# Patient Record
Sex: MALE | Race: WHITE | NOT HISPANIC OR LATINO | URBAN - METROPOLITAN AREA
[De-identification: names, ages, dates, MRNs, and addresses within clinical notes are randomized per-mention and may not be internally consistent; named-entity substitution may affect disease eponyms.]

---

## 2018-01-30 ENCOUNTER — EMERGENCY (EMERGENCY)
Facility: HOSPITAL | Age: 23
LOS: 1 days | Discharge: HOME | End: 2018-01-30
Attending: EMERGENCY MEDICINE | Admitting: EMERGENCY MEDICINE
Payer: COMMERCIAL

## 2018-01-30 VITALS
HEART RATE: 143 BPM | HEIGHT: 65 IN | RESPIRATION RATE: 34 BRPM | WEIGHT: 160.06 LBS | OXYGEN SATURATION: 90 % | SYSTOLIC BLOOD PRESSURE: 145 MMHG | DIASTOLIC BLOOD PRESSURE: 84 MMHG

## 2018-01-30 VITALS
OXYGEN SATURATION: 98 % | DIASTOLIC BLOOD PRESSURE: 63 MMHG | TEMPERATURE: 98 F | SYSTOLIC BLOOD PRESSURE: 118 MMHG | HEART RATE: 87 BPM | RESPIRATION RATE: 22 BRPM

## 2018-01-30 DIAGNOSIS — Z98.2 PRESENCE OF CEREBROSPINAL FLUID DRAINAGE DEVICE: Chronic | ICD-10-CM

## 2018-01-30 LAB
ALBUMIN SERPL ELPH-MCNC: 5.3 G/DL — HIGH (ref 3.3–5.2)
ALP SERPL-CCNC: 108 U/L — SIGNIFICANT CHANGE UP (ref 40–120)
ALT FLD-CCNC: 25 U/L — SIGNIFICANT CHANGE UP
ANION GAP SERPL CALC-SCNC: 13 MMOL/L — SIGNIFICANT CHANGE UP (ref 5–17)
ANION GAP SERPL CALC-SCNC: >43 MMOL/L — HIGH (ref 5–17)
AST SERPL-CCNC: 32 U/L — SIGNIFICANT CHANGE UP
BILIRUB SERPL-MCNC: 0.3 MG/DL — LOW (ref 0.4–2)
BUN SERPL-MCNC: 10 MG/DL — SIGNIFICANT CHANGE UP (ref 8–20)
BUN SERPL-MCNC: 8 MG/DL — SIGNIFICANT CHANGE UP (ref 8–20)
CALCIUM SERPL-MCNC: 10 MG/DL — SIGNIFICANT CHANGE UP (ref 8.6–10.2)
CALCIUM SERPL-MCNC: 8.2 MG/DL — LOW (ref 8.6–10.2)
CARBAMAZEPINE SERPL-MCNC: 9.4 UG/ML — SIGNIFICANT CHANGE UP (ref 4–10)
CHLORIDE SERPL-SCNC: 81 MMOL/L — LOW (ref 98–107)
CHLORIDE SERPL-SCNC: 92 MMOL/L — LOW (ref 98–107)
CO2 SERPL-SCNC: 22 MMOL/L — SIGNIFICANT CHANGE UP (ref 22–29)
CO2 SERPL-SCNC: <6 MMOL/L — CRITICAL LOW (ref 22–29)
CREAT SERPL-MCNC: 0.94 MG/DL — SIGNIFICANT CHANGE UP (ref 0.5–1.3)
CREAT SERPL-MCNC: 1.05 MG/DL — SIGNIFICANT CHANGE UP (ref 0.5–1.3)
EOSINOPHIL NFR BLD AUTO: 2 % — SIGNIFICANT CHANGE UP (ref 0–6)
GLUCOSE SERPL-MCNC: 110 MG/DL — SIGNIFICANT CHANGE UP (ref 70–115)
GLUCOSE SERPL-MCNC: 166 MG/DL — HIGH (ref 70–115)
HCT VFR BLD CALC: 49.6 % — SIGNIFICANT CHANGE UP (ref 42–52)
HGB BLD-MCNC: 16.9 G/DL — SIGNIFICANT CHANGE UP (ref 14–18)
LYMPHOCYTES # BLD AUTO: 47 % — SIGNIFICANT CHANGE UP (ref 20–55)
MCHC RBC-ENTMCNC: 30.3 PG — SIGNIFICANT CHANGE UP (ref 27–31)
MCHC RBC-ENTMCNC: 34.1 G/DL — SIGNIFICANT CHANGE UP (ref 32–36)
MCV RBC AUTO: 88.9 FL — SIGNIFICANT CHANGE UP (ref 80–94)
MONOCYTES NFR BLD AUTO: 14 % — HIGH (ref 3–10)
NEUTROPHILS NFR BLD AUTO: 37 % — SIGNIFICANT CHANGE UP (ref 37–73)
NRBC # BLD: 1 /100 — HIGH (ref 0–0)
PLAT MORPH BLD: NORMAL — SIGNIFICANT CHANGE UP
PLATELET # BLD AUTO: 316 K/UL — SIGNIFICANT CHANGE UP (ref 150–400)
POTASSIUM SERPL-MCNC: 3.4 MMOL/L — LOW (ref 3.5–5.3)
POTASSIUM SERPL-MCNC: 4 MMOL/L — SIGNIFICANT CHANGE UP (ref 3.5–5.3)
POTASSIUM SERPL-SCNC: 3.4 MMOL/L — LOW (ref 3.5–5.3)
POTASSIUM SERPL-SCNC: 4 MMOL/L — SIGNIFICANT CHANGE UP (ref 3.5–5.3)
PROT SERPL-MCNC: 8.6 G/DL — SIGNIFICANT CHANGE UP (ref 6.6–8.7)
RBC # BLD: 5.58 M/UL — SIGNIFICANT CHANGE UP (ref 4.6–6.2)
RBC # FLD: 12.5 % — SIGNIFICANT CHANGE UP (ref 11–15.6)
RBC BLD AUTO: NORMAL — SIGNIFICANT CHANGE UP
SODIUM SERPL-SCNC: 127 MMOL/L — LOW (ref 135–145)
SODIUM SERPL-SCNC: 130 MMOL/L — LOW (ref 135–145)
WBC # BLD: 19.5 K/UL — HIGH (ref 4.8–10.8)
WBC # FLD AUTO: 19.5 K/UL — HIGH (ref 4.8–10.8)

## 2018-01-30 PROCEDURE — 99283 EMERGENCY DEPT VISIT LOW MDM: CPT | Mod: 25

## 2018-01-30 PROCEDURE — 99284 EMERGENCY DEPT VISIT MOD MDM: CPT

## 2018-01-30 PROCEDURE — 82553 CREATINE MB FRACTION: CPT

## 2018-01-30 PROCEDURE — 85027 COMPLETE CBC AUTOMATED: CPT

## 2018-01-30 PROCEDURE — 80053 COMPREHEN METABOLIC PANEL: CPT

## 2018-01-30 PROCEDURE — 80048 BASIC METABOLIC PNL TOTAL CA: CPT

## 2018-01-30 PROCEDURE — 80156 ASSAY CARBAMAZEPINE TOTAL: CPT

## 2018-01-30 PROCEDURE — 93010 ELECTROCARDIOGRAM REPORT: CPT

## 2018-01-30 PROCEDURE — 82550 ASSAY OF CK (CPK): CPT

## 2018-01-30 PROCEDURE — 93005 ELECTROCARDIOGRAM TRACING: CPT

## 2018-01-30 PROCEDURE — 36415 COLL VENOUS BLD VENIPUNCTURE: CPT

## 2018-01-30 RX ORDER — SODIUM CHLORIDE 9 MG/ML
1000 INJECTION INTRAMUSCULAR; INTRAVENOUS; SUBCUTANEOUS ONCE
Refills: 0 | Status: COMPLETED | OUTPATIENT
Start: 2018-01-30 | End: 2018-01-30

## 2018-01-30 RX ORDER — CARBAMAZEPINE 200 MG
700 TABLET ORAL ONCE
Refills: 0 | Status: COMPLETED | OUTPATIENT
Start: 2018-01-30 | End: 2018-01-30

## 2018-01-30 RX ORDER — OLANZAPINE 15 MG/1
5 TABLET, FILM COATED ORAL ONCE
Refills: 0 | Status: COMPLETED | OUTPATIENT
Start: 2018-01-30 | End: 2018-01-30

## 2018-01-30 RX ADMIN — Medication 700 MILLIGRAM(S): at 23:25

## 2018-01-30 RX ADMIN — OLANZAPINE 5 MILLIGRAM(S): 15 TABLET, FILM COATED ORAL at 23:25

## 2018-01-30 RX ADMIN — SODIUM CHLORIDE 2000 MILLILITER(S): 9 INJECTION INTRAMUSCULAR; INTRAVENOUS; SUBCUTANEOUS at 22:10

## 2018-01-30 NOTE — ED ADULT NURSE NOTE - OBJECTIVE STATEMENT
special needs individual c/o seizure x 2 today. patient is non verbal, autistics. parents at the bedside.

## 2018-01-30 NOTE — ED PROVIDER NOTE - OBJECTIVE STATEMENT
23 y/o M pt with hx of epilepsy presents to ED with group home staff c/o seizures. Patient's group home brought him to [facility] today where he had 2 seizures, one lasted 45 seconds and one lasted 60 seconds. Pt is nonverbal at baseline per patient's father. Group home staff notes patient's Tegretol was increased 3 weeks ago.   Neuro: Luna 23 y/o M pt with hx of epilepsy and austism presents to ED with group home staff c/o seizures. Patient's group home brought him to [facility] today where he had 2 seizures, one lasted 45 seconds and one lasted 60 seconds. Pt is nonverbal at baseline per patient's father. Group home staff notes patient's Tegretol was increased 3 weeks ago.   Neuro: Luna

## 2018-01-30 NOTE — ED PROVIDER NOTE - MEDICAL DECISION MAKING DETAILS
back at [facility] per family no fevers will f.u with there established neurologist return to ed for intractable HA, persistent vomiting, or new onset motor/sensory deficits

## 2018-01-30 NOTE — ED ADULT TRIAGE NOTE - CHIEF COMPLAINT QUOTE
Patient brought in by ambulance, was Ilya Zone-s/p seizure-45seconds, patient recd 5mg IM Versed.  Patient belongs to [facility] group [facility].

## 2018-02-09 ENCOUNTER — INPATIENT (INPATIENT)
Facility: HOSPITAL | Age: 23
LOS: 1 days | Discharge: HOME | DRG: 101 | End: 2018-02-11
Attending: HOSPITALIST | Admitting: HOSPITALIST
Payer: COMMERCIAL

## 2018-02-09 VITALS
WEIGHT: 167.99 LBS | SYSTOLIC BLOOD PRESSURE: 132 MMHG | RESPIRATION RATE: 16 BRPM | HEIGHT: 62 IN | OXYGEN SATURATION: 97 % | DIASTOLIC BLOOD PRESSURE: 60 MMHG | HEART RATE: 140 BPM

## 2018-02-09 DIAGNOSIS — N17.9 ACUTE KIDNEY FAILURE, UNSPECIFIED: ICD-10-CM

## 2018-02-09 DIAGNOSIS — M62.82 RHABDOMYOLYSIS: ICD-10-CM

## 2018-02-09 DIAGNOSIS — Z98.2 PRESENCE OF CEREBROSPINAL FLUID DRAINAGE DEVICE: Chronic | ICD-10-CM

## 2018-02-09 DIAGNOSIS — I10 ESSENTIAL (PRIMARY) HYPERTENSION: ICD-10-CM

## 2018-02-09 DIAGNOSIS — R56.9 UNSPECIFIED CONVULSIONS: ICD-10-CM

## 2018-02-09 DIAGNOSIS — E72.20 DISORDER OF UREA CYCLE METABOLISM, UNSPECIFIED: ICD-10-CM

## 2018-02-09 DIAGNOSIS — E87.1 HYPO-OSMOLALITY AND HYPONATREMIA: ICD-10-CM

## 2018-02-09 DIAGNOSIS — Z96.89 PRESENCE OF OTHER SPECIFIED FUNCTIONAL IMPLANTS: Chronic | ICD-10-CM

## 2018-02-09 DIAGNOSIS — Z29.9 ENCOUNTER FOR PROPHYLACTIC MEASURES, UNSPECIFIED: ICD-10-CM

## 2018-02-09 LAB
ALBUMIN SERPL ELPH-MCNC: 4.6 G/DL — SIGNIFICANT CHANGE UP (ref 3.3–5)
ALP SERPL-CCNC: 95 U/L — SIGNIFICANT CHANGE UP (ref 40–120)
ALT FLD-CCNC: 24 U/L — SIGNIFICANT CHANGE UP (ref 12–78)
AMMONIA BLD-MCNC: 143 UMOL/L — HIGH (ref 11–32)
ANION GAP SERPL CALC-SCNC: 27 MMOL/L — HIGH (ref 5–17)
APPEARANCE UR: CLEAR — SIGNIFICANT CHANGE UP
AST SERPL-CCNC: 21 U/L — SIGNIFICANT CHANGE UP (ref 15–37)
BACTERIA # UR AUTO: ABNORMAL
BASOPHILS # BLD AUTO: 0.1 K/UL — SIGNIFICANT CHANGE UP (ref 0–0.2)
BASOPHILS NFR BLD AUTO: 0.6 % — SIGNIFICANT CHANGE UP (ref 0–2)
BILIRUB SERPL-MCNC: 0.2 MG/DL — SIGNIFICANT CHANGE UP (ref 0.2–1.2)
BILIRUB UR-MCNC: NEGATIVE — SIGNIFICANT CHANGE UP
BUN SERPL-MCNC: 12 MG/DL — SIGNIFICANT CHANGE UP (ref 7–23)
CALCIUM SERPL-MCNC: 8.7 MG/DL — SIGNIFICANT CHANGE UP (ref 8.5–10.1)
CARBAMAZEPINE SERPL-MCNC: 10.4 UG/ML — SIGNIFICANT CHANGE UP (ref 4–12)
CHLORIDE SERPL-SCNC: 95 MMOL/L — LOW (ref 96–108)
CK MB BLD-MCNC: 1.2 % — SIGNIFICANT CHANGE UP (ref 0–3.5)
CK MB CFR SERPL CALC: 3.8 NG/ML — HIGH (ref 0–3.6)
CK SERPL-CCNC: 328 U/L — HIGH (ref 26–308)
CO2 SERPL-SCNC: 9 MMOL/L — CRITICAL LOW (ref 22–31)
COLOR SPEC: YELLOW — SIGNIFICANT CHANGE UP
CREAT SERPL-MCNC: 1.5 MG/DL — HIGH (ref 0.5–1.3)
DIFF PNL FLD: ABNORMAL
EOSINOPHIL # BLD AUTO: 0 K/UL — SIGNIFICANT CHANGE UP (ref 0–0.5)
EOSINOPHIL NFR BLD AUTO: 0.3 % — SIGNIFICANT CHANGE UP (ref 0–6)
EPI CELLS # UR: SIGNIFICANT CHANGE UP
GLUCOSE SERPL-MCNC: 124 MG/DL — HIGH (ref 70–99)
GLUCOSE UR QL: NEGATIVE — SIGNIFICANT CHANGE UP
HCT VFR BLD CALC: 45.4 % — SIGNIFICANT CHANGE UP (ref 39–50)
HGB BLD-MCNC: 15.4 G/DL — SIGNIFICANT CHANGE UP (ref 13–17)
KETONES UR-MCNC: ABNORMAL
LEUKOCYTE ESTERASE UR-ACNC: NEGATIVE — SIGNIFICANT CHANGE UP
LYMPHOCYTES # BLD AUTO: 25.5 % — SIGNIFICANT CHANGE UP (ref 13–44)
LYMPHOCYTES # BLD AUTO: 3.8 K/UL — HIGH (ref 1–3.3)
MCHC RBC-ENTMCNC: 29.4 PG — SIGNIFICANT CHANGE UP (ref 27–34)
MCHC RBC-ENTMCNC: 34 GM/DL — SIGNIFICANT CHANGE UP (ref 32–36)
MCV RBC AUTO: 86.6 FL — SIGNIFICANT CHANGE UP (ref 80–100)
MONOCYTES # BLD AUTO: 0.9 K/UL — SIGNIFICANT CHANGE UP (ref 0–0.9)
MONOCYTES NFR BLD AUTO: 6.2 % — SIGNIFICANT CHANGE UP (ref 1–9)
NEUTROPHILS # BLD AUTO: 10.1 K/UL — HIGH (ref 1.8–7.4)
NEUTROPHILS NFR BLD AUTO: 67.5 % — SIGNIFICANT CHANGE UP (ref 43–77)
NITRITE UR-MCNC: NEGATIVE — SIGNIFICANT CHANGE UP
PH UR: 5 — SIGNIFICANT CHANGE UP (ref 5–8)
PLATELET # BLD AUTO: 305 K/UL — SIGNIFICANT CHANGE UP (ref 150–400)
POTASSIUM SERPL-MCNC: 3.9 MMOL/L — SIGNIFICANT CHANGE UP (ref 3.5–5.3)
POTASSIUM SERPL-SCNC: 3.9 MMOL/L — SIGNIFICANT CHANGE UP (ref 3.5–5.3)
PROT SERPL-MCNC: 8.3 G/DL — SIGNIFICANT CHANGE UP (ref 6–8.3)
PROT UR-MCNC: 25 MG/DL
RBC # BLD: 5.24 M/UL — SIGNIFICANT CHANGE UP (ref 4.2–5.8)
RBC # FLD: 11.1 % — SIGNIFICANT CHANGE UP (ref 10.3–14.5)
RBC CASTS # UR COMP ASSIST: SIGNIFICANT CHANGE UP /HPF (ref 0–4)
SODIUM SERPL-SCNC: 131 MMOL/L — LOW (ref 135–145)
SP GR SPEC: 1.01 — SIGNIFICANT CHANGE UP (ref 1.01–1.02)
UROBILINOGEN FLD QL: NEGATIVE — SIGNIFICANT CHANGE UP
WBC # BLD: 14.9 K/UL — HIGH (ref 3.8–10.5)
WBC # FLD AUTO: 14.9 K/UL — HIGH (ref 3.8–10.5)
WBC UR QL: SIGNIFICANT CHANGE UP

## 2018-02-09 PROCEDURE — 71045 X-RAY EXAM CHEST 1 VIEW: CPT | Mod: 26

## 2018-02-09 PROCEDURE — 99285 EMERGENCY DEPT VISIT HI MDM: CPT

## 2018-02-09 PROCEDURE — 99223 1ST HOSP IP/OBS HIGH 75: CPT | Mod: AI,GC

## 2018-02-09 RX ORDER — CARBAMAZEPINE 200 MG
700 TABLET ORAL
Qty: 0 | Refills: 0 | Status: DISCONTINUED | OUTPATIENT
Start: 2018-02-09 | End: 2018-02-10

## 2018-02-09 RX ORDER — OLANZAPINE 15 MG/1
2.5 TABLET, FILM COATED ORAL DAILY
Qty: 0 | Refills: 0 | Status: DISCONTINUED | OUTPATIENT
Start: 2018-02-09 | End: 2018-02-09

## 2018-02-09 RX ORDER — RUFINAMIDE 40 MG/ML
1200 SUSPENSION ORAL
Qty: 0 | Refills: 0 | Status: DISCONTINUED | OUTPATIENT
Start: 2018-02-09 | End: 2018-02-11

## 2018-02-09 RX ORDER — SODIUM CHLORIDE 9 MG/ML
1000 INJECTION INTRAMUSCULAR; INTRAVENOUS; SUBCUTANEOUS ONCE
Qty: 0 | Refills: 0 | Status: COMPLETED | OUTPATIENT
Start: 2018-02-09 | End: 2018-02-09

## 2018-02-09 RX ORDER — LISINOPRIL 2.5 MG/1
5 TABLET ORAL DAILY
Qty: 0 | Refills: 0 | Status: DISCONTINUED | OUTPATIENT
Start: 2018-02-09 | End: 2018-02-09

## 2018-02-09 RX ORDER — SODIUM CHLORIDE 9 MG/ML
1000 INJECTION INTRAMUSCULAR; INTRAVENOUS; SUBCUTANEOUS
Qty: 0 | Refills: 0 | Status: DISCONTINUED | OUTPATIENT
Start: 2018-02-09 | End: 2018-02-10

## 2018-02-09 RX ORDER — OLANZAPINE 15 MG/1
5 TABLET, FILM COATED ORAL DAILY
Qty: 0 | Refills: 0 | Status: DISCONTINUED | OUTPATIENT
Start: 2018-02-09 | End: 2018-02-11

## 2018-02-09 RX ORDER — OLANZAPINE 15 MG/1
2.5 TABLET, FILM COATED ORAL DAILY
Qty: 0 | Refills: 0 | Status: DISCONTINUED | OUTPATIENT
Start: 2018-02-09 | End: 2018-02-11

## 2018-02-09 RX ORDER — OLANZAPINE 15 MG/1
5 TABLET, FILM COATED ORAL DAILY
Qty: 0 | Refills: 0 | Status: DISCONTINUED | OUTPATIENT
Start: 2018-02-09 | End: 2018-02-09

## 2018-02-09 RX ADMIN — SODIUM CHLORIDE 100 MILLILITER(S): 9 INJECTION INTRAMUSCULAR; INTRAVENOUS; SUBCUTANEOUS at 23:14

## 2018-02-09 RX ADMIN — RUFINAMIDE 1200 MILLIGRAM(S): 40 SUSPENSION ORAL at 21:38

## 2018-02-09 RX ADMIN — SODIUM CHLORIDE 1000 MILLILITER(S): 9 INJECTION INTRAMUSCULAR; INTRAVENOUS; SUBCUTANEOUS at 19:30

## 2018-02-09 NOTE — H&P ADULT - PROBLEM SELECTOR PLAN 7
DVT PPX: SCDs  IMPROVE VTE Individual Risk Assessment          RISK                                                          Points  [  ] Previous VTE                                                3  [  ] Thrombophilia                                             2  [  ] Lower limb paralysis                                   2        (unable to hold up >15 seconds)    [  ] Current Cancer                                             2         (within 6 months)  [  ] Immobilization > 24 hrs                              1  [  ] ICU/CCU stay > 24 hours                             1  [  ] Age > 60                                                         1    IMPROVE VTE Score: 0

## 2018-02-09 NOTE — H&P ADULT - HISTORY OF PRESENT ILLNESS
22 year old male with PMH of Autism, nonverbal-MR, Epilepsy with hx of rhabdo and has left sided VNS, presented with seizures. Hx obtained from parents and brother at bedside. Patient is a resident of [facility], and comes home on the weekend. Patient's father had picked him up today and when he parked in the driveway patient began seizing. Described the seizure as similar to the grand mal tonic clonic seizures he has had in the past, he began to shake uncontrollably, eyes rolled back and he spit up some phlegm/clear fluid. As per father patient had his seatbelt still on, denied vomiting, loss of bowel/bladder control, or head trauma, episode lasted about a minute and a half. Patient had another seizure episode in the ambulance, this one was about a minute and terminated with administration of IV midazolam by EMS, no vomiting, loss of bowel/bladder control, or head trauma. Patient had a third seizure while in the ER, lasted 15 seconds, terminated when patient's father swiped magnet over VNS stimulater, again no vomiting, HT, loss of bowel/bladder.  Of note patient has had more frequent seizures since January (approx one every 2 weeks) when one of his seizure medications were adjusted (Tegretol). Also was started on Accupril about one week prior. As per parents patient did not have any recent illness, no fever, cough, urinary sx prior to these events, was in usual state of health.  Vital Signs in ED  T(F): 98 HR: 100 BP: 134/68 RR: 18 SpO2: 100%  Labs sig for WBC 14.9, Na 131, Cr 1.5, Ammonia 143, , CKMB 3.8   UA showed moderate blood, Protein 25  CXR: No evidence for pulmonary consolidation, pleural effusion or   pneumothorax.

## 2018-02-09 NOTE — H&P ADULT - PROBLEM SELECTOR PLAN 1
Admit to [facility]  Seizure precautions  Continue with home seizure medications, carbamazepine lvl wnl, hyponatremic, elevated CK and ammonia  Continue with NS @ 100cc hr  Neuro Dr Narvaez consulted, recs appreciated Admit to [facility]  Seizure precautions  Continue with home seizure medications  Continue with NS @ 100cc hr  Neuro Dr Narvaez consulted, recs appreciated

## 2018-02-09 NOTE — H&P ADULT - PROBLEM SELECTOR PLAN 4
Likely due to dehydration  C/w NS @ 100 cc/hr for now  Avoid nephrotoxic agents  Monitor renal indices Likely due to dehydration  C/w NS @ 100 cc/hr for now  Avoid nephrotoxic agents, hold ACE   Monitor renal indices

## 2018-02-09 NOTE — ED ADULT NURSE NOTE - PMH
Agitation  at times and angry  Autistic disorder    Constipation    GERD (gastroesophageal reflux disease)    Psychomotor epilepsy, intractable    Seizure disorder  grand mal, started age 11

## 2018-02-09 NOTE — H&P ADULT - NSHPPHYSICALEXAM_GEN_ALL_CORE
Physical Exam:  General: Somnolent  male  HEENT: Patient somnolent however clenching his eyes and mouth closed, NCAT, PERRLA  Neck: Supple, nontender, no mass  Neurology: A&Ox0, nonfocal, unable to assess CN, is responsive to tactile stimuli, appears agitated when attempted to wake him up, otherwise resting quietly, moving UE and head spontaneously, not opening eyes or mouth  Respiratory: CTA B/L, No W/R/R  CV: RRR, +S1/S2, no murmurs, rubs or gallops  Abdominal: Soft, NT, ND +BSx4  Extremities: No C/C/E, + peripheral pulses  MSK: no joint erythema or warmth, no joint swelling   Skin: warm, dry, normal color Physical Exam:  General: Somnolent  male  HEENT: Patient somnolent however clenching his eyes and mouth closed, NCAT, PERRLA  Neck: Supple, nontender, no mass  Neurology: A&Ox0, nonfocal, unable to assess CN, is responsive to tactile stimuli, appears agitated when attempted to wake him up, otherwise resting quietly, moving UE and head spontaneously. non verbal but cues parents to go to bathroom. Observed gait, WNL  Respiratory: CTA B/L, No W/R/R  CV: RRR, +S1/S2, no murmurs, rubs or gallops  Abdominal: Soft, NT, ND +BSx4  Extremities: No C/C/E, + peripheral pulses  MSK: no joint erythema or warmth, no joint swelling   Skin: warm, dry, normal color

## 2018-02-09 NOTE — ED PROVIDER NOTE - CARE PLAN
Principal Discharge DX:	Seizure Principal Discharge DX:	Seizure  Secondary Diagnosis:	Hyperammonemia

## 2018-02-09 NOTE — ED PROVIDER NOTE - OBJECTIVE STATEMENT
Pt is a 21 yo male who has hx of autism, not  verbal mr epilepsy with rhabdo an implanted vagal nerve stimulator whose pmd is dr sam huff in [facility]). during the week he is a resident of [facility] and on weekends he comes home with parents.  Dad was pulling into the drive and noticed pt was seizing.  lasted a minute. ems was called and when they arrived pt had a second seizure.  pt was given midazolam iv by ems and stopped seizing.  he is accompanied by his parents who provided data

## 2018-02-09 NOTE — H&P ADULT - NSHPSOCIALHISTORY_GEN_ALL_CORE
Resident of [facility] in [facility] M-F, weekends with parents  No hx of smoking, ETOH  Severe autism with hx of epilepsy  Able to walk independently- no cane/walker

## 2018-02-09 NOTE — ED ADULT NURSE REASSESSMENT NOTE - NS ED NURSE REASSESS COMMENT FT1
witnessed gran mal seizure.  ativan given via verbal order Dr molina.  Seizure precautions maintained

## 2018-02-09 NOTE — H&P ADULT - PROBLEM SELECTOR PLAN 3
Patient has hx of rhabdo w/ seizures in the past. CK elevated although doesn't meet cut off, moderate blood noted in urine. K+ wnl  Continue with NS at 100cc hr for now  monitor BMP, morning labs Patient has hx of rhabdo w/ seizures in the past. CK elevated although doesn't meet cut off, moderate blood noted in urine. K+ wnl. Per parents, CPK often over [id number] after 24 hours post seizure  Continue with NS at 100cc hr for now  monitor BMP, morning labs

## 2018-02-09 NOTE — H&P ADULT - PROBLEM SELECTOR PLAN 5
Mild hyponatremia, c/w NS 100cc/hr Mild hyponatremia, c/w NS 100cc/hr  attribute to meds [id number]) vs decreased PO intake  consider serum Osm workup if persists   Recheck level at 2AM, avoid rapid correction

## 2018-02-09 NOTE — ED ADULT TRIAGE NOTE - CHIEF COMPLAINT QUOTE
brought in by EMS for seizure. patient with history of seizure having 2 episodes tonight, one at home and one in the ambulance. EMS gave 1 x 5mg versed IV

## 2018-02-09 NOTE — H&P ADULT - ATTENDING COMMENTS
Seen and examined by attending MD, agree with above and edited to reflect my findings. Case discussed with parents who are at bedside, and voiced understanding and agreement to aforementioned plan.

## 2018-02-09 NOTE — H&P ADULT - PROBLEM SELECTOR PLAN 2
Continue with NS 100cc hr for now. Consider lactulose if f/u ammonia lvl elevated- if elevated consider lactulose Continue with NS 100cc hr for now  Receheck after IVF  hydration, if f/u ammonia lvl elevated- if elevated consider lactulose

## 2018-02-09 NOTE — ED PROVIDER NOTE - PROGRESS NOTE DETAILS
pt has another seizure, stopped with his vns magnet by dad  mom expressed concern that he needs admission for fear of rhabdo  neurology  paged dr wilson and hospitalis paged to admit edita wilson- increase benzyl to 1200 bid will see in am continue other meds as well. aware of current lab results

## 2018-02-09 NOTE — ED ADULT NURSE NOTE - OBJECTIVE STATEMENT
brought in by ems s/p seizure activity at home.  patient received versed in field.  patient is not arousable.  moves with tactile stimuli

## 2018-02-09 NOTE — H&P ADULT - ASSESSMENT
22 year old male with PMH of Autism, nonverbal-MR, Epilepsy with hx of rhabdo and has left sided VNS, admitted with seizures.

## 2018-02-09 NOTE — H&P ADULT - NSHPLABSRESULTS_GEN_ALL_CORE
15.4   14.9  )-----------( 305      ( 2018 19:51 )             45.4     2018 19:51    131    |  95     |  12     ----------------------------<  124    3.9     |  9      |  1.50     Ca    8.7        2018 19:51    TPro  8.3    /  Alb  4.6    /  TBili  0.2    /  DBili  x      /  AST  21     /  ALT  24     /  AlkPhos  95     2018 19:51    LIVER FUNCTIONS - ( 2018 19:51 )  Alb: 4.6 g/dL / Pro: 8.3 g/dL / ALK PHOS: 95 U/L / ALT: 24 U/L / AST: 21 U/L / GGT: x             CAPILLARY BLOOD GLUCOSE        CARDIAC MARKERS ( 2018 19:51 )  x     / x     / 328 U/L / x     / 3.8 ng/mL      Urinalysis Basic - ( 2018 19:51 )    Color: Yellow / Appearance: Clear / S.015 / pH: x  Gluc: x / Ketone: Small  / Bili: Negative / Urobili: Negative   Blood: x / Protein: 25 mg/dL / Nitrite: Negative   Leuk Esterase: Negative / RBC: 0-2 /HPF / WBC 0-2   Sq Epi: x / Non Sq Epi: Occasional / Bacteria: Occasional

## 2018-02-09 NOTE — ED PROVIDER NOTE - NEUROLOGICAL, MLM
asleep unable to awaken as he was medicated, no current seizure activity no incontinence or lacerations

## 2018-02-09 NOTE — H&P ADULT - NSHPOUTPATIENTPROVIDERS_GEN_ALL_CORE
PMD Dr. Sherman Pretty ([facility]) PMD Dr. Sherman Pretty ([facility] [place])

## 2018-02-10 LAB
AMMONIA BLD-MCNC: 34 UMOL/L — HIGH (ref 11–32)
ANION GAP SERPL CALC-SCNC: 11 MMOL/L — SIGNIFICANT CHANGE UP (ref 5–17)
ANION GAP SERPL CALC-SCNC: 8 MMOL/L — SIGNIFICANT CHANGE UP (ref 5–17)
BUN SERPL-MCNC: 7 MG/DL — SIGNIFICANT CHANGE UP (ref 7–23)
BUN SERPL-MCNC: 9 MG/DL — SIGNIFICANT CHANGE UP (ref 7–23)
CALCIUM SERPL-MCNC: 7.8 MG/DL — LOW (ref 8.5–10.1)
CALCIUM SERPL-MCNC: 8.3 MG/DL — LOW (ref 8.5–10.1)
CHLORIDE SERPL-SCNC: 110 MMOL/L — HIGH (ref 96–108)
CHLORIDE SERPL-SCNC: 110 MMOL/L — HIGH (ref 96–108)
CO2 SERPL-SCNC: 22 MMOL/L — SIGNIFICANT CHANGE UP (ref 22–31)
CO2 SERPL-SCNC: 24 MMOL/L — SIGNIFICANT CHANGE UP (ref 22–31)
CREAT SERPL-MCNC: 0.83 MG/DL — SIGNIFICANT CHANGE UP (ref 0.5–1.3)
CREAT SERPL-MCNC: 0.85 MG/DL — SIGNIFICANT CHANGE UP (ref 0.5–1.3)
GLUCOSE SERPL-MCNC: 80 MG/DL — SIGNIFICANT CHANGE UP (ref 70–99)
GLUCOSE SERPL-MCNC: 92 MG/DL — SIGNIFICANT CHANGE UP (ref 70–99)
HCT VFR BLD CALC: 39.1 % — SIGNIFICANT CHANGE UP (ref 39–50)
HGB BLD-MCNC: 14.3 G/DL — SIGNIFICANT CHANGE UP (ref 13–17)
MCHC RBC-ENTMCNC: 31 PG — SIGNIFICANT CHANGE UP (ref 27–34)
MCHC RBC-ENTMCNC: 36.5 GM/DL — HIGH (ref 32–36)
MCV RBC AUTO: 84.8 FL — SIGNIFICANT CHANGE UP (ref 80–100)
PLATELET # BLD AUTO: 216 K/UL — SIGNIFICANT CHANGE UP (ref 150–400)
POTASSIUM SERPL-MCNC: 3.6 MMOL/L — SIGNIFICANT CHANGE UP (ref 3.5–5.3)
POTASSIUM SERPL-MCNC: 3.8 MMOL/L — SIGNIFICANT CHANGE UP (ref 3.5–5.3)
POTASSIUM SERPL-SCNC: 3.6 MMOL/L — SIGNIFICANT CHANGE UP (ref 3.5–5.3)
POTASSIUM SERPL-SCNC: 3.8 MMOL/L — SIGNIFICANT CHANGE UP (ref 3.5–5.3)
RBC # BLD: 4.62 M/UL — SIGNIFICANT CHANGE UP (ref 4.2–5.8)
RBC # FLD: 11.2 % — SIGNIFICANT CHANGE UP (ref 10.3–14.5)
SODIUM SERPL-SCNC: 142 MMOL/L — SIGNIFICANT CHANGE UP (ref 135–145)
SODIUM SERPL-SCNC: 143 MMOL/L — SIGNIFICANT CHANGE UP (ref 135–145)
WBC # BLD: 9.3 K/UL — SIGNIFICANT CHANGE UP (ref 3.8–10.5)
WBC # FLD AUTO: 9.3 K/UL — SIGNIFICANT CHANGE UP (ref 3.8–10.5)

## 2018-02-10 PROCEDURE — 93010 ELECTROCARDIOGRAM REPORT: CPT

## 2018-02-10 PROCEDURE — 99233 SBSQ HOSP IP/OBS HIGH 50: CPT

## 2018-02-10 RX ORDER — LISINOPRIL 2.5 MG/1
5 TABLET ORAL DAILY
Qty: 0 | Refills: 0 | Status: DISCONTINUED | OUTPATIENT
Start: 2018-02-10 | End: 2018-02-11

## 2018-02-10 RX ORDER — CARBAMAZEPINE 200 MG
600 TABLET ORAL
Qty: 0 | Refills: 0 | Status: DISCONTINUED | OUTPATIENT
Start: 2018-02-10 | End: 2018-02-11

## 2018-02-10 RX ORDER — SODIUM CHLORIDE 9 MG/ML
1000 INJECTION, SOLUTION INTRAVENOUS
Qty: 0 | Refills: 0 | Status: DISCONTINUED | OUTPATIENT
Start: 2018-02-10 | End: 2018-02-11

## 2018-02-10 RX ADMIN — Medication 700 MILLIGRAM(S): at 08:17

## 2018-02-10 RX ADMIN — RUFINAMIDE 1200 MILLIGRAM(S): 40 SUSPENSION ORAL at 09:23

## 2018-02-10 RX ADMIN — Medication 600 MILLIGRAM(S): at 20:11

## 2018-02-10 RX ADMIN — OLANZAPINE 5 MILLIGRAM(S): 15 TABLET, FILM COATED ORAL at 20:11

## 2018-02-10 RX ADMIN — RUFINAMIDE 1200 MILLIGRAM(S): 40 SUSPENSION ORAL at 20:11

## 2018-02-10 RX ADMIN — OLANZAPINE 2.5 MILLIGRAM(S): 15 TABLET, FILM COATED ORAL at 08:17

## 2018-02-10 RX ADMIN — SODIUM CHLORIDE 100 MILLILITER(S): 9 INJECTION, SOLUTION INTRAVENOUS at 12:23

## 2018-02-10 NOTE — PROGRESS NOTE ADULT - PROBLEM SELECTOR PLAN 1
Admit to F  Seizure precautions  Continue with home seizure medications  Tegretol level WNL, but per mother, up one point from previous labs.  Neuro eval pending (Brice consulted)

## 2018-02-10 NOTE — PROGRESS NOTE ADULT - PROBLEM SELECTOR PLAN 5
?secondary to seizure meds.  Resolved.  Fluids stopped overnight as patient has corrected to 142.   Labs pending this AM. Will follow-up and restart fluids if necessary.

## 2018-02-10 NOTE — CONSULT NOTE ADULT - ASSESSMENT
sz increase banzel to 1200 bid  as per family request and as per family tegertol was going to be tapered down will change to 600 bid  ativan prn  monitor cpk   if sz free 24 hours ok to dc

## 2018-02-11 ENCOUNTER — TRANSCRIPTION ENCOUNTER (OUTPATIENT)
Age: 23
End: 2018-02-11

## 2018-02-11 VITALS
TEMPERATURE: 99 F | OXYGEN SATURATION: 98 % | DIASTOLIC BLOOD PRESSURE: 62 MMHG | SYSTOLIC BLOOD PRESSURE: 100 MMHG | HEART RATE: 81 BPM | RESPIRATION RATE: 16 BRPM

## 2018-02-11 LAB
ALBUMIN SERPL ELPH-MCNC: 3.7 G/DL — SIGNIFICANT CHANGE UP (ref 3.3–5)
ALP SERPL-CCNC: 83 U/L — SIGNIFICANT CHANGE UP (ref 40–120)
ALT FLD-CCNC: 18 U/L — SIGNIFICANT CHANGE UP (ref 12–78)
AMMONIA BLD-MCNC: 42 UMOL/L — HIGH (ref 11–32)
ANION GAP SERPL CALC-SCNC: 11 MMOL/L — SIGNIFICANT CHANGE UP (ref 5–17)
AST SERPL-CCNC: 17 U/L — SIGNIFICANT CHANGE UP (ref 15–37)
BILIRUB SERPL-MCNC: 0.3 MG/DL — SIGNIFICANT CHANGE UP (ref 0.2–1.2)
BUN SERPL-MCNC: 7 MG/DL — SIGNIFICANT CHANGE UP (ref 7–23)
CALCIUM SERPL-MCNC: 8.5 MG/DL — SIGNIFICANT CHANGE UP (ref 8.5–10.1)
CHLORIDE SERPL-SCNC: 106 MMOL/L — SIGNIFICANT CHANGE UP (ref 96–108)
CK SERPL-CCNC: 260 U/L — SIGNIFICANT CHANGE UP (ref 26–308)
CO2 SERPL-SCNC: 26 MMOL/L — SIGNIFICANT CHANGE UP (ref 22–31)
CREAT SERPL-MCNC: 1 MG/DL — SIGNIFICANT CHANGE UP (ref 0.5–1.3)
GLUCOSE SERPL-MCNC: 95 MG/DL — SIGNIFICANT CHANGE UP (ref 70–99)
HCT VFR BLD CALC: 39.5 % — SIGNIFICANT CHANGE UP (ref 39–50)
HGB BLD-MCNC: 13.7 G/DL — SIGNIFICANT CHANGE UP (ref 13–17)
MCHC RBC-ENTMCNC: 30 PG — SIGNIFICANT CHANGE UP (ref 27–34)
MCHC RBC-ENTMCNC: 34.8 GM/DL — SIGNIFICANT CHANGE UP (ref 32–36)
MCV RBC AUTO: 86.4 FL — SIGNIFICANT CHANGE UP (ref 80–100)
PLATELET # BLD AUTO: 212 K/UL — SIGNIFICANT CHANGE UP (ref 150–400)
POTASSIUM SERPL-MCNC: 3 MMOL/L — LOW (ref 3.5–5.3)
POTASSIUM SERPL-SCNC: 3 MMOL/L — LOW (ref 3.5–5.3)
PROT SERPL-MCNC: 6.9 G/DL — SIGNIFICANT CHANGE UP (ref 6–8.3)
RBC # BLD: 4.57 M/UL — SIGNIFICANT CHANGE UP (ref 4.2–5.8)
RBC # FLD: 11.4 % — SIGNIFICANT CHANGE UP (ref 10.3–14.5)
SODIUM SERPL-SCNC: 143 MMOL/L — SIGNIFICANT CHANGE UP (ref 135–145)
WBC # BLD: 6.2 K/UL — SIGNIFICANT CHANGE UP (ref 3.8–10.5)
WBC # FLD AUTO: 6.2 K/UL — SIGNIFICANT CHANGE UP (ref 3.8–10.5)

## 2018-02-11 PROCEDURE — 80048 BASIC METABOLIC PNL TOTAL CA: CPT

## 2018-02-11 PROCEDURE — 82550 ASSAY OF CK (CPK): CPT

## 2018-02-11 PROCEDURE — 81001 URINALYSIS AUTO W/SCOPE: CPT

## 2018-02-11 PROCEDURE — 71045 X-RAY EXAM CHEST 1 VIEW: CPT

## 2018-02-11 PROCEDURE — 99285 EMERGENCY DEPT VISIT HI MDM: CPT | Mod: 25

## 2018-02-11 PROCEDURE — 36415 COLL VENOUS BLD VENIPUNCTURE: CPT

## 2018-02-11 PROCEDURE — 80156 ASSAY CARBAMAZEPINE TOTAL: CPT

## 2018-02-11 PROCEDURE — 82140 ASSAY OF AMMONIA: CPT

## 2018-02-11 PROCEDURE — 82553 CREATINE MB FRACTION: CPT

## 2018-02-11 PROCEDURE — 93005 ELECTROCARDIOGRAM TRACING: CPT

## 2018-02-11 PROCEDURE — 85027 COMPLETE CBC AUTOMATED: CPT

## 2018-02-11 PROCEDURE — 99239 HOSP IP/OBS DSCHRG MGMT >30: CPT

## 2018-02-11 PROCEDURE — 80053 COMPREHEN METABOLIC PANEL: CPT

## 2018-02-11 RX ORDER — RUFINAMIDE 40 MG/ML
30 SUSPENSION ORAL
Qty: 0 | Refills: 0 | COMMUNITY
Start: 2018-02-11

## 2018-02-11 RX ORDER — CARBAMAZEPINE 200 MG
3 TABLET ORAL
Qty: 180 | Refills: 0
Start: 2018-02-11 | End: 2018-03-12

## 2018-02-11 RX ORDER — CARBAMAZEPINE 200 MG
3.5 TABLET ORAL
Qty: 0 | Refills: 0 | COMMUNITY

## 2018-02-11 RX ORDER — ACETAMINOPHEN 500 MG
2 TABLET ORAL
Qty: 0 | Refills: 0 | COMMUNITY

## 2018-02-11 RX ORDER — CARBAMAZEPINE 200 MG
3 TABLET ORAL
Qty: 0 | Refills: 0 | COMMUNITY
Start: 2018-02-11

## 2018-02-11 RX ORDER — POTASSIUM CHLORIDE 20 MEQ
40 PACKET (EA) ORAL ONCE
Qty: 0 | Refills: 0 | Status: COMPLETED | OUTPATIENT
Start: 2018-02-11 | End: 2018-02-11

## 2018-02-11 RX ORDER — RUFINAMIDE 40 MG/ML
3 SUSPENSION ORAL
Qty: 180 | Refills: 0
Start: 2018-02-11 | End: 2018-03-12

## 2018-02-11 RX ORDER — RUFINAMIDE 40 MG/ML
5 SUSPENSION ORAL
Qty: 0 | Refills: 0 | COMMUNITY

## 2018-02-11 RX ORDER — OLANZAPINE 15 MG/1
1 TABLET, FILM COATED ORAL
Qty: 0 | Refills: 0 | COMMUNITY

## 2018-02-11 RX ADMIN — LISINOPRIL 5 MILLIGRAM(S): 2.5 TABLET ORAL at 06:16

## 2018-02-11 RX ADMIN — RUFINAMIDE 1200 MILLIGRAM(S): 40 SUSPENSION ORAL at 07:49

## 2018-02-11 RX ADMIN — Medication 600 MILLIGRAM(S): at 07:49

## 2018-02-11 RX ADMIN — Medication 40 MILLIEQUIVALENT(S): at 11:28

## 2018-02-11 RX ADMIN — OLANZAPINE 2.5 MILLIGRAM(S): 15 TABLET, FILM COATED ORAL at 07:48

## 2018-02-11 NOTE — PROGRESS NOTE ADULT - SUBJECTIVE AND OBJECTIVE BOX
Neurology follow up note    LAMINE [id number]      Interval History:    Patient seen with mom no new events     MEDICATIONS    carBAMazepine 600 milliGRAM(s) Oral two times a day  lisinopril 5 milliGRAM(s) Oral daily  LORazepam   Injectable 1 milliGRAM(s) IV Push once PRN  OLANZapine 2.5 milliGRAM(s) Oral daily  OLANZapine 5 milliGRAM(s) Oral daily  rufinamide Suspension 1200 milliGRAM(s) Oral two times a day  sodium chloride 0.45%. 1000 milliLiter(s) IV Continuous <Continuous>      Allergies    flu vaccine (Anaphylaxis)  penicillins (Rash)    Intolerances            Vital Signs Last 24 Hrs  T(C): 36.6 (2018 04:31), Max: 36.7 (10 Feb 2018 21:02)  T(F): 97.9 (2018 04:31), Max: 98.1 (10 Feb 2018 21:02)  HR: 66 (2018 04:31) (66 - 96)  BP: 136/77 (2018 04:31) (119/74 - 146/78)  BP(mean): --  RR: 16 (2018 04:31) (16 - 18)  SpO2: 97% (2018 04:31) (96% - 97%)      REVIEW OF SYSTEMS: Non Verbal      On Neurological Examination:  resting in bed   speech non verbal baseline  motor as baseline  does have rocking movement.    GENERAL Exam: Nontoxic , No Acute Distress   	  HEENT:  normocephalic, atraumatic  		  LUNGS: Clear bilaterally  	  HEART: Normal S1S2   No murmur RRR        	  GI/ ABDOMEN:  Soft  Non tender    EXTREMITIES:   No Edema  No Clubbing  No Cyanosis No Edema    SKIN: Normal  No Ecchymosis               LABS:  CBC Full  -  ( 2018 09:30 )  WBC Count : 6.2 K/uL  Hemoglobin : 13.7 g/dL  Hematocrit : 39.5 %  Platelet Count - Automated : 212 K/uL  Mean Cell Volume : 86.4 fl  Mean Cell Hemoglobin : 30.0 pg  Mean Cell Hemoglobin Concentration : 34.8 gm/dL  Auto Neutrophil # : x  Auto Lymphocyte # : x  Auto Monocyte # : x  Auto Eosinophil # : x  Auto Basophil # : x  Auto Neutrophil % : x  Auto Lymphocyte % : x  Auto Monocyte % : x  Auto Eosinophil % : x  Auto Basophil % : x    Urinalysis Basic - ( 2018 19:51 )    Color: Yellow / Appearance: Clear / S.015 / pH: x  Gluc: x / Ketone: Small  / Bili: Negative / Urobili: Negative   Blood: x / Protein: 25 mg/dL / Nitrite: Negative   Leuk Esterase: Negative / RBC: 0-2 /HPF / WBC 0-2   Sq Epi: x / Non Sq Epi: Occasional / Bacteria: Occasional          143  |  106  |  7   ----------------------------<  95  3.0<L>   |  26  |  1.00    Ca    8.5      2018 09:30    TPro  6.9  /  Alb  3.7  /  TBili  0.3  /  DBili  x   /  AST  17  /  ALT  18  /  AlkPhos  83  02-11    Hemoglobin A1C:     LIVER FUNCTIONS - ( 2018 09:30 )  Alb: 3.7 g/dL / Pro: 6.9 g/dL / ALK PHOS: 83 U/L / ALT: 18 U/L / AST: 17 U/L / GGT: x           Vitamin B12         RADIOLOGY      ANALYSIS AND PLAN:  This is a 22-year-old with status epilepticus.  1.	For status epilepticus  2.	I would recommend to increase the patient's benzal to 1200 mg twice a day, had a discussion with family it appears that their plan on the outside was to possibly taper the patient off Tegretol and increase benzo, so, benzal was already increased, I will decrease the patient's Tegretol to 600 twice a day.  3.	I would recommend to use Ativan as needed.  4.	Seizure precautions.  5.	Monitor oral intake.  6.	Monitor the patient's CPK level and continue IV fluids as needed.  7.	If the patient is seizure-free then the patient is clear from Neurology standpoint for discharge planning.  8.	Spoke with mom at bedside     Greater than 45 minutes spent in direct patient care reviewing  the notes, lab data/ imaging , discussion with multidisciplinary team.
HPI:  22 year old male with PMH of Autism, mental retardation, non-verbal, Epilepsy with hx of rhabdo and has left sided VNS, presented with seizures.    INTERVAL EVENTS: No further seizure activity. Patient resting comfortably in bed, awake and at baseline per mother. Unable to voice any complaints, but ambulating to restroom with no difficulty.    REVIEW OF SYSTEMS:    Unable to obtain secondary to patient being non-verbal.    VITAL SIGNS:  Vital Signs Last 24 Hrs  T(C): 37.3 (02-10-18 @ 05:15), Max: 37.3 (02-10-18 @ 05:15)  T(F): 99.1 (02-10-18 @ 05:15), Max: 99.1 (02-10-18 @ 05:15)  HR: 91 (02-10-18 @ 05:15) (91 - 140)  BP: 106/67 (02-10-18 @ 05:15) (106/67 - 135/87)  BP(mean): --  RR: 16 (02-10-18 @ 05:15) (16 - 18)  SpO2: 96% (02-10-18 @ 05:15) (96% - 100%)      PHYSICAL EXAM:     GENERAL: no acute distress  HEENT: NC/AT, EOMI, neck supple, MMM  RESPIRATORY: LCTAB/L, no rhonchi, rales, or wheezing  CARDIOVASCULAR: RRR, no murmurs, gallops, rubs  ABDOMINAL: soft, non-tender, non-distended, positive bowel sounds   EXTREMITIES: no clubbing, cyanosis, or edema  NEUROLOGICAL: awake, alert, non-verbal. Moves all 4 extremities, gait WNL  SKIN: no rashes or lesions   MUSCULOSKELETAL: no gross joint deformity                          15.4   14.9  )-----------( 305      ( 09 Feb 2018 19:51 )             45.4     02-10    142  |  110<H>  |  9   ----------------------------<  92  3.6   |  24  |  0.83    Ca    7.8<L>      10 Feb 2018 01:55    TPro  8.3  /  Alb  4.6  /  TBili  0.2  /  DBili  x   /  AST  21  /  ALT  24  /  AlkPhos  95  02-09    MEDICATIONS  (STANDING):  carBAMazepine 700 milliGRAM(s) Oral two times a day  OLANZapine 2.5 milliGRAM(s) Oral daily  OLANZapine 5 milliGRAM(s) Oral daily  rufinamide Suspension 1200 milliGRAM(s) Oral two times a day    MEDICATIONS  (PRN):  LORazepam   Injectable 1 milliGRAM(s) IV Push once PRN seizure

## 2018-02-11 NOTE — DISCHARGE NOTE ADULT - CARE PLAN
Principal Discharge DX:	Seizure  Goal:	prevention of further episodes  Assessment and plan of treatment:	Continue Tegretol at decreased dose 600 mg two times daily.  Continue Banzel at increased dose 1,200 mg two times daily.  Follow up with neurology.  Secondary Diagnosis:	Rhabdomyolysis  Assessment and plan of treatment:	Resolved with IV fluids.  Stay well hydrated.  Secondary Diagnosis:	Hyperammonemia  Assessment and plan of treatment:	Likely secondary to seizure.  Improved.  Secondary Diagnosis:	Hyponatremia  Assessment and plan of treatment:	Resolved.  Follow up with PCP for routine blood work in 1-2 weeks.  Secondary Diagnosis:	Agitation  Assessment and plan of treatment:	Continue Zyprexa as prescribed.  Secondary Diagnosis:	HTN (hypertension)  Assessment and plan of treatment:	Continue Quinapril.

## 2018-02-11 NOTE — DISCHARGE NOTE ADULT - HOSPITAL COURSE
22 year old male with PMH of Autism, nonverbal-MR, Epilepsy with hx of rhabdo and has left sided VNS, presented with seizures. Patient is a resident of [facility] M-[facility], and comes home on the weekend. Patient's father had picked him up and when he parked in the driveway patient began seizing. Described the seizure as similar to the grand mal tonic clonic seizures he has had in the past, he began to shake uncontrollably, eyes rolled back and he spit up some phlegm/clear fluid. As per father patient had his seatbelt still on, denied vomiting, loss of bowel/bladder control, or head trauma, episode lasted about a minute and a half. Patient had another seizure episode in the ambulance, this one was about a minute and terminated with administration of IV midazolam by EMS, no vomiting, loss of bowel/bladder control, or head trauma. Patient had a third seizure while in the ER, lasted 15 seconds, terminated when patient's father swiped magnet over VNS stimulater, again no vomiting, trauma, loss of bowel/bladder.  Of note patient has had more frequent seizures since January (approx one every 2 weeks) when one of his seizure medications were adjusted (Tegretol). Also was started on Accupril about one week prior. As per parents patient did not have any recent illness, no fever, cough, urinary sx prior to these events, was in usual state of health.    Patient was seen and evaluated by Dr. Narvaez from Neurology. His Tegretol was decreased and his Benzel was increased. He remained seizure free >24 hours. He was given IV fluids for his mild rhabdomyolysis and it resolved. His MEY also resolved with IV fluids. He had elevated ammonia level that also improved prior to discharge.     Patient was seen and examined at bedside.    VITAL SIGNS:  Vital Signs Last 24 Hrs  T(C): 36.6 (02-11-18 @ 04:31), Max: 36.7 (02-10-18 @ 21:02)  T(F): 97.9 (02-11-18 @ 04:31), Max: 98.1 (02-10-18 @ 21:02)  HR: 66 (02-11-18 @ 04:31) (66 - 96)  BP: 136/77 (02-11-18 @ 04:31) (119/74 - 146/78)  BP(mean): --  RR: 16 (02-11-18 @ 04:31) (16 - 18)  SpO2: 97% (02-11-18 @ 04:31) (96% - 97%)      PHYSICAL EXAM:     GENERAL: no acute distress  HEENT: NC/AT, EOMI, neck supple, MMM  RESPIRATORY: LCTAB/L, no rhonchi, rales, or wheezing  CARDIOVASCULAR: RRR, no murmurs, gallops, rubs  ABDOMINAL: soft, non-tender, non-distended, positive bowel sounds   EXTREMITIES: no clubbing, cyanosis, or edema  NEUROLOGICAL: awake, alert, non-verbal, otherwise non-focal exam.  SKIN: no rashes or lesions   MUSCULOSKELETAL: no gross joint deformity    Patient is stable for discharge. He has been cleared by neurology. He will follow up with his PCP and Neurologist within 1 week.     Time spent: 40 mins.

## 2018-02-11 NOTE — DISCHARGE NOTE ADULT - ADDITIONAL INSTRUCTIONS
Patient is cleared to return to his activities without restrictions. He may resume all home medications with changes to Tegretol and Benzel doses and noted above. He may resume his regular diet.

## 2018-02-11 NOTE — DISCHARGE NOTE ADULT - PLAN OF CARE
Continue Tegretol at decreased dose 600 mg two times daily.  Continue Banzel at increased dose 1,200 mg two times daily.  Follow up with neurology. Resolved with IV fluids.  Stay well hydrated. Likely secondary to seizure.  Improved. Resolved.  Follow up with PCP for routine blood work in 1-2 weeks. Continue Zyprexa as prescribed. Continue Quinapril. prevention of further episodes

## 2018-02-11 NOTE — DISCHARGE NOTE ADULT - CARE PROVIDER_API CALL
sam strickland  Phone: [phone]  Fax: (   )    -    lj (neurology),   Phone: [phone]  Fax: (   )    -

## 2018-02-11 NOTE — DISCHARGE NOTE ADULT - MEDICATION SUMMARY - MEDICATIONS TO TAKE
I will START or STAY ON the medications listed below when I get home from the hospital:    Tylenol 500 mg oral tablet  -- 2 tab(s) by mouth every 6 hours, As Needed  -- Indication: For pain    quinapril 5 mg oral tablet  -- 1 tab(s) by mouth once a day  Hold for BP <100/60  -- Indication: For HTN (hypertension)    Banzel 400 mg oral tablet  -- 3 tab(s) by mouth 2 times a day   -- Check with your doctor before becoming pregnant.  It is very important that you take or use this exactly as directed.  Do not skip doses or discontinue unless directed by your doctor.  May cause drowsiness or dizziness.  Obtain medical advice before taking any non-prescription drugs as some may affect the action of this medication.  Other drugs may decrease the effect of this prescription.  Contact your physician for further advice.  Take with food.  This drug may impair the ability to drive or operate machinery.  Use care until you become familiar with its effects.    -- Indication: For Seizure    carBAMazepine 200 mg oral tablet  -- 3 tab(s) by mouth 2 times a day   -- Do not take this drug if you are pregnant.  It is very important that you take or use this exactly as directed.  Do not skip doses or discontinue unless directed by your doctor.  May cause drowsiness.  Alcohol may intensify this effect.  Use care when operating dangerous machinery.  Obtain medical advice before taking any non-prescription drugs as some may affect the action of this medication.    -- Indication: For Seizure    ZyPREXA 2.5 mg oral tablet  -- 1 tab(s) by mouth once a day 8am  -- Indication: For Agitation    ZyPREXA 5 mg oral tablet  -- 1 tab(s) by mouth once a day 8pm    -- Indication: For Agitation

## 2018-02-11 NOTE — DISCHARGE NOTE ADULT - PROVIDER TOKENS
FREE:[LAST:[marco a],FIRST:[sam],PHONE:[phone]],FAX:[(   )    -]],FREE:[LAST:[lj (neurology)],PHONE:[phone]:[(   )    -]]

## 2018-02-11 NOTE — DISCHARGE NOTE ADULT - PATIENT PORTAL LINK FT
You can access the [facility] Patient Portal, offered by [facility], by registering with the following website: [email or web address]

## 2018-02-11 NOTE — DISCHARGE NOTE ADULT - MEDICATION SUMMARY - MEDICATIONS TO CHANGE
I will SWITCH the dose or number of times a day I take the medications listed below when I get home from the hospital:    Banzel 200 mg oral tablet  -- 5 tab(s) by mouth 2 times a day    TEGretol 200 mg oral tablet  -- 3.5 tab(s) by mouth 2 times a day

## 2019-11-06 NOTE — ED PROVIDER NOTE - CPE EDP ENMT NORM
Patient:   RADHA KITCHEN            MRN: [id number]            FIN: [id number]              Age:   80 years     Sex:  MALE     :  39   Associated Diagnoses:   None   Author:   MATT CHASE     Chief Complaint   bradycardia     History of Present Illness   The patient is an 80-year-old male with a past medical history of coronary artery disease status post two-vessel CABG on 10/23/19, aortic stenosis status post surgical aortic valve replacement on 10/23/2019, Essential hypertension, dyslipidemia, and postoperative atrial fibrillation who presents with complaints of low heart rate, low blood pressure, and lightheadedness.  The patient states that he was seen and evaluated at rehab.  The patient  was told that his blood pressure and his heart rate were low.  He states that he was instructed not to take his lisinopril.  He states that despite that, his heart rate remained low.  He does report having occasional lightheadedness.  He denies having any chest pain or palpitations at any time.  He states that he is compliant with all his medications.  He denies ever passing out.      Review of Systems   Constitutional:  Negative.    Endocrine:  Negative.    Eye:  Negative.    Ear/Nose/Mouth/Throat:  Negative.    Respiratory:  Negative.    Cardiovascular:  Negative except as documented in history of present illness.   Gastrointestinal:  Negative.    Genitourinary:  Negative.    Hematology/Lymphatics:  Negative.    Musculoskeletal:  Negative.    Integumentary:  Negative.    Neurologic:  Negative.      Histories   Past Med History: Aortic stenosis  CAD (coronary artery disease)  HTN (hypertension)  Hard of hearing  Hyperlipidemia  RBBB  Risk factors for obstructive sleep apnea      Family History: MOTHER: Ischemic heart disease  FATHER: Ischemic heart disease      Procedure History.Social History       Alcohol  Details: Alcohol Abuse in Household: No.  Use: Current.  If current Alcohol user: More than 5  (M) or 4 (F) drinks within a couple of hours? No.  Exercise  Details: Exercise: Regularly.  Times Per Week: 5-6 times/week.  Type: Aerobics.  Substance Abuse  Details: Use: None.  Tobacco  Details: Smoked/Smokeless Tobacco Last 30 Days: No.  Smoking Tobacco Use: Former smoker.  Smokeless Tobacco Use Never.  Type: Cigarettes.  .       Health Status   Allergies: Allergies (ST)   Allergies (1) Active Reaction  NKA None Documented    Current medications:    No qualifying data available      Physical Examination   VS/Measurements       Vitals between:   05-NOV-2019 17:36:20   TO   06-NOV-2019 17:36:20                   LAST RESULT MINIMUM MAXIMUM  Temperature 36.7 36.7 36.7  Heart Rate 40 40 45  Respiratory Rate 18 18 22  NISBP           127 100 127  NIDBP           61 59 61  NIMBP           83 73 83  SpO2                    99 99 99    General:  Alert and oriented, No acute distress.    Eye:  Extraocular movements are intact.    HENT:  No pharyngeal erythema.    Neck:  Supple, No jugular venous distention, No lymphadenopathy.   Respiratory:  Lungs are clear to auscultation.    Cardiovascular:  Regular rhythm, No murmur, Bradycardia, No edema.   Gastrointestinal:  Soft, Non-tender, Non-distended, Normal bowel sounds.   Musculoskeletal:  No tenderness.    Integumentary:  Warm, Dry.    Neurologic:  No focal deficits.      Review / Management   Laboratory results:       Labs between:  05-NOV-2019 17:36 to 06-NOV-2019 17:36    CBC:                 WBC  HgB  Hct  Plt  MCV  RDW   06-NOV-2019 10.2  (L) 9.0  (L) 27.6  372  99.3  13.7     DIFF:                 Seg  Neutroph//ABS  Lymph//ABS  Mono//ABS  EOS/ABS  06-NOV-2019 NOT APPLICABLE  67 // 7.1 20 // 2.0 9 // 0.9 2 // 0.2    BMP:                 Na  Cl  BUN  Glu   06-NOV-2019 137  103  (H) 29  96                              K  CO2  Cr  Ca                              4.4  28  (H) 1.35  8.9     CMP:                 AST  ALT  AlkPhos  Bili  Albumin   06-NOV-2019 16  33  88   0.4  (L) 3.1     Other Chem:             Mg  Phos  Triglycerides  GGTP  DirectBili                           (H) 2.6             COAG:                 INR  PT  PTT  Ddimer  Fibrinogen    06-NOV-2019 1.0  10.8  26                      ,   Cardiovascular Labs    NT proBNP 3240 pg/mL (11/06/19 15:00)    Troponin 0.16 ng/mL (11/06/19 15:00)    .    Radiology results   Result title:  XR CHEST 1V  Result status:  Final  Verified by:  RICARDO, KAMLESH JAMISON on 11/06/2019 14:59  Impression:Stable cardiomegaly.  No acute pulmonary findings.   ,       ECG interp:  (visualized and independently interpreted): junctional bradycardia, 43 bpm.   Documentation reviewed:  Reviewed prior records.      Impression and Plan   junctional bradycardia - patient has been on metoprolol and amiodarone  - discontinue metoprolol and amiodarone  - order 2D echo  - if heart rate does not improve within 24-48 hours off meds, patient will need pacemaker for sinus node dysfunction.    CAD - s/p recent cabg  - continue asa and statin    htn - bp controlled  - continue to monitor    as - s/p surgical AVR    Discussed with Dr. Xavier and Dr. Mario Glasgow MD   normal...

## 2021-11-12 NOTE — ED ADULT TRIAGE NOTE - HEIGHT IN INCHES
11/12/21 1230   Exercise (Home) O2 Eval   Liter Flow 0   Heart rate at rest 90 beats/min   SpO2 at rest 98 %   Heart rate during activity 124 beats/min   SpO2 during activity 94 %   Heart rate after activity 95 beats/min   SpO2 after activity 94 %   Distance (feet) 100 ft   Tolerance tolerated well   $ Exercise O2 procedure complete (Pulmonary Stress Test) ([place] Only) Procedure Complete   Patient seen for home O2 eval.  Placed patient on room air and ambulated approximately 100 feet.  O2 saturation maintained 93-96%.     2

## 2022-06-25 ENCOUNTER — NON-APPOINTMENT (OUTPATIENT)
Age: 27
End: 2022-06-25

## 2023-12-19 PROBLEM — R56.9 UNSPECIFIED CONVULSIONS: Chronic | Status: ACTIVE | Noted: 2018-01-30

## 2024-01-03 ENCOUNTER — OUTPATIENT (OUTPATIENT)
Dept: OUTPATIENT SERVICES | Facility: HOSPITAL | Age: 29
LOS: 1 days | End: 2024-01-03
Payer: MEDICAID

## 2024-01-03 VITALS
HEIGHT: 63 IN | SYSTOLIC BLOOD PRESSURE: 135 MMHG | OXYGEN SATURATION: 97 % | DIASTOLIC BLOOD PRESSURE: 82 MMHG | HEART RATE: 97 BPM | TEMPERATURE: 98 F | RESPIRATION RATE: 18 BRPM | WEIGHT: 144.62 LBS

## 2024-01-03 DIAGNOSIS — G40.909 EPILEPSY, UNSPECIFIED, NOT INTRACTABLE, WITHOUT STATUS EPILEPTICUS: ICD-10-CM

## 2024-01-03 DIAGNOSIS — Z96.89 PRESENCE OF OTHER SPECIFIED FUNCTIONAL IMPLANTS: Chronic | ICD-10-CM

## 2024-01-03 DIAGNOSIS — S49.92XD UNSPECIFIED INJURY OF LEFT SHOULDER AND UPPER ARM, SUBSEQUENT ENCOUNTER: Chronic | ICD-10-CM

## 2024-01-03 DIAGNOSIS — Z98.2 PRESENCE OF CEREBROSPINAL FLUID DRAINAGE DEVICE: Chronic | ICD-10-CM

## 2024-01-03 DIAGNOSIS — K02.9 DENTAL CARIES, UNSPECIFIED: ICD-10-CM

## 2024-01-03 DIAGNOSIS — K02.62 DENTAL CARIES ON SMOOTH SURFACE PENETRATING INTO DENTIN: ICD-10-CM

## 2024-01-03 LAB
ANION GAP SERPL CALC-SCNC: 14 MMOL/L — SIGNIFICANT CHANGE UP (ref 5–17)
ANION GAP SERPL CALC-SCNC: 14 MMOL/L — SIGNIFICANT CHANGE UP (ref 5–17)
BUN SERPL-MCNC: 13 MG/DL — SIGNIFICANT CHANGE UP (ref 7–23)
BUN SERPL-MCNC: 13 MG/DL — SIGNIFICANT CHANGE UP (ref 7–23)
CALCIUM SERPL-MCNC: 9.4 MG/DL — SIGNIFICANT CHANGE UP (ref 8.4–10.5)
CALCIUM SERPL-MCNC: 9.4 MG/DL — SIGNIFICANT CHANGE UP (ref 8.4–10.5)
CHLORIDE SERPL-SCNC: 99 MMOL/L — SIGNIFICANT CHANGE UP (ref 96–108)
CHLORIDE SERPL-SCNC: 99 MMOL/L — SIGNIFICANT CHANGE UP (ref 96–108)
CO2 SERPL-SCNC: 21 MMOL/L — LOW (ref 22–31)
CO2 SERPL-SCNC: 21 MMOL/L — LOW (ref 22–31)
CREAT SERPL-MCNC: 0.82 MG/DL — SIGNIFICANT CHANGE UP (ref 0.5–1.3)
CREAT SERPL-MCNC: 0.82 MG/DL — SIGNIFICANT CHANGE UP (ref 0.5–1.3)
EGFR: 123 ML/MIN/1.73M2 — SIGNIFICANT CHANGE UP
EGFR: 123 ML/MIN/1.73M2 — SIGNIFICANT CHANGE UP
GLUCOSE SERPL-MCNC: 141 MG/DL — HIGH (ref 70–99)
GLUCOSE SERPL-MCNC: 141 MG/DL — HIGH (ref 70–99)
HCT VFR BLD CALC: 41.4 % — SIGNIFICANT CHANGE UP (ref 39–50)
HCT VFR BLD CALC: 41.4 % — SIGNIFICANT CHANGE UP (ref 39–50)
HGB BLD-MCNC: 14.6 G/DL — SIGNIFICANT CHANGE UP (ref 13–17)
HGB BLD-MCNC: 14.6 G/DL — SIGNIFICANT CHANGE UP (ref 13–17)
MCHC RBC-ENTMCNC: 29.5 PG — SIGNIFICANT CHANGE UP (ref 27–34)
MCHC RBC-ENTMCNC: 29.5 PG — SIGNIFICANT CHANGE UP (ref 27–34)
MCHC RBC-ENTMCNC: 35.3 GM/DL — SIGNIFICANT CHANGE UP (ref 32–36)
MCHC RBC-ENTMCNC: 35.3 GM/DL — SIGNIFICANT CHANGE UP (ref 32–36)
MCV RBC AUTO: 83.6 FL — SIGNIFICANT CHANGE UP (ref 80–100)
MCV RBC AUTO: 83.6 FL — SIGNIFICANT CHANGE UP (ref 80–100)
NRBC # BLD: 0 /100 WBCS — SIGNIFICANT CHANGE UP (ref 0–0)
NRBC # BLD: 0 /100 WBCS — SIGNIFICANT CHANGE UP (ref 0–0)
PLATELET # BLD AUTO: 267 K/UL — SIGNIFICANT CHANGE UP (ref 150–400)
PLATELET # BLD AUTO: 267 K/UL — SIGNIFICANT CHANGE UP (ref 150–400)
POTASSIUM SERPL-MCNC: 3.6 MMOL/L — SIGNIFICANT CHANGE UP (ref 3.5–5.3)
POTASSIUM SERPL-MCNC: 3.6 MMOL/L — SIGNIFICANT CHANGE UP (ref 3.5–5.3)
POTASSIUM SERPL-SCNC: 3.6 MMOL/L — SIGNIFICANT CHANGE UP (ref 3.5–5.3)
POTASSIUM SERPL-SCNC: 3.6 MMOL/L — SIGNIFICANT CHANGE UP (ref 3.5–5.3)
RBC # BLD: 4.95 M/UL — SIGNIFICANT CHANGE UP (ref 4.2–5.8)
RBC # BLD: 4.95 M/UL — SIGNIFICANT CHANGE UP (ref 4.2–5.8)
RBC # FLD: 12.8 % — SIGNIFICANT CHANGE UP (ref 10.3–14.5)
RBC # FLD: 12.8 % — SIGNIFICANT CHANGE UP (ref 10.3–14.5)
SODIUM SERPL-SCNC: 134 MMOL/L — LOW (ref 135–145)
SODIUM SERPL-SCNC: 134 MMOL/L — LOW (ref 135–145)
WBC # BLD: 4.21 K/UL — SIGNIFICANT CHANGE UP (ref 3.8–10.5)
WBC # BLD: 4.21 K/UL — SIGNIFICANT CHANGE UP (ref 3.8–10.5)
WBC # FLD AUTO: 4.21 K/UL — SIGNIFICANT CHANGE UP (ref 3.8–10.5)
WBC # FLD AUTO: 4.21 K/UL — SIGNIFICANT CHANGE UP (ref 3.8–10.5)

## 2024-01-03 PROCEDURE — 85027 COMPLETE CBC AUTOMATED: CPT

## 2024-01-03 PROCEDURE — 80048 BASIC METABOLIC PNL TOTAL CA: CPT

## 2024-01-03 PROCEDURE — G0463: CPT

## 2024-01-03 RX ORDER — RUFINAMIDE 40 MG/ML
2 SUSPENSION ORAL
Qty: 0 | Refills: 0 | DISCHARGE

## 2024-01-03 RX ORDER — CARBAMAZEPINE 200 MG
3.5 TABLET ORAL
Qty: 0 | Refills: 0 | DISCHARGE

## 2024-01-03 RX ORDER — OLANZAPINE 15 MG/1
1 TABLET, FILM COATED ORAL
Qty: 0 | Refills: 0 | DISCHARGE

## 2024-01-03 RX ORDER — ACETAMINOPHEN 500 MG
2 TABLET ORAL
Qty: 0 | Refills: 0 | DISCHARGE

## 2024-01-03 RX ORDER — SODIUM CHLORIDE 9 MG/ML
3 INJECTION INTRAMUSCULAR; INTRAVENOUS; SUBCUTANEOUS EVERY 8 HOURS
Refills: 0 | Status: DISCONTINUED | OUTPATIENT
Start: 2024-01-24 | End: 2024-01-24

## 2024-01-03 RX ORDER — QUINAPRIL HYDROCHLORIDE 40 MG/1
1 TABLET, FILM COATED ORAL
Qty: 0 | Refills: 0 | DISCHARGE

## 2024-01-03 RX ORDER — LIDOCAINE HCL 20 MG/ML
0.2 VIAL (ML) INJECTION ONCE
Refills: 0 | Status: DISCONTINUED | OUTPATIENT
Start: 2024-01-24 | End: 2024-01-24

## 2024-01-03 NOTE — H&P PST ADULT - HISTORY OF PRESENT ILLNESS
This is a 28 year old male with past medical history of  Autism, nonverbal-MR, Epilepsy. Last seizure      Presenting to [facility] accompanied with group aide for scheduled Comprehensive dental treatment under anesthesia on 1/24/24 with Dr. Xiong This is a 28 year old male with past medical history of  Autism, nonverbal-MR, Epilepsy. Last seizure      Presenting to [facility] accompanied with group aide for scheduled Comprehensive dental treatment under anesthesia on 1/24/24 with Dr. 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 Last seizure on 12/27/23. Presenting to [facility] accompanied with group aide for scheduled Comprehensive dental treatment under anesthesia on 1/24/24 with Dr. Xiong.    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 Last seizure on 12/27/23. Presenting to [facility] accompanied with group aide for scheduled Comprehensive dental treatment under anesthesia on 1/24/24 with Dr. Xiong.    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 Last seizure on 12/27/23. Lasting 1 minute with use of VNS and diastat nasal spray.  Presenting to [facility] accompanied with group aide for scheduled Comprehensive dental treatment under anesthesia on 1/24/24 with Dr. Xiong.    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 Last seizure on 12/27/23. Lasting 1 minute with use of VNS and diastat nasal spray.  Presenting to [facility] accompanied with group aide for scheduled Comprehensive dental treatment under anesthesia on 1/24/24 with Dr. Xiong.    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 Last seizure on 12/27/23  Lasting 1 minute with use of VNS and diastat nasal spray for seizure management.  Averages 2 seizures monthly.  Presenting to [facility] accompanied with group aide for scheduled Comprehensive dental treatment under anesthesia on 1/24/24 with Dr. Xiong.    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 Last seizure on 12/27/23  Lasting 1 minute with use of VNS and diastat nasal spray for seizure management.  Averages 2 seizures monthly.  Presenting to [facility] accompanied with group aide for scheduled Comprehensive dental treatment under anesthesia on 1/24/24 with Dr. Xiong.    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 Last seizure on 12/27/23  Lasting 1 minute with use of VNS and diastat nasal spray for seizure management.  Averages 2 seizures monthly.  Presenting to [facility] accompanied with group aide for scheduled Comprehensive dental treatment under anesthesia on 1/24/24 with Dr. Xiong.    Addendum Case discussed with Dr. Gale on Teams. Will re-route to [facility] as seizures are uncontrolled.       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 Last seizure on 12/27/23  Lasting 1 minute with use of VNS and diastat nasal spray for seizure management.  Averages 2 seizures monthly.  Presenting to [facility] accompanied with group aide for scheduled Comprehensive dental treatment under anesthesia on 1/24/24 with Dr. Xiong.    Addendum Case discussed with Dr. Gale on Teams. Will re-route to [facility] as seizures are uncontrolled.       * Aide Mancera (mother ) - Guardian  **Shayy Rubalcava,  [phone]  * Idalmis Marie, house manage

## 2024-01-03 NOTE — H&P PST ADULT - NSICDXFAMILYHX_GEN_ALL_CORE_FT
FAMILY HISTORY:  Father  Still living? Unknown  Paternal family history of seizure, Age at diagnosis: Age Unknown

## 2024-01-03 NOTE — H&P PST ADULT - PSYCHIATRIC COMMENTS
h/o autism non- verbal h/o autism - at times has aggressive behavior h/o autism - at times has aggressive behavior as per aide. During PST visit, pt cooperative

## 2024-01-03 NOTE — H&P PST ADULT - PROBLEM SELECTOR PLAN 1
Preop instructions given to aide  Labs CBC BMP performed in [facility]   Medical eval

## 2024-01-03 NOTE — H&P PST ADULT - NSICDXPASTSURGICALHX_GEN_ALL_CORE_FT
PAST SURGICAL HISTORY:  H/O ventricular shunt     S/P placement of VNS (vagus nerve stimulation) device      PAST SURGICAL HISTORY:  H/O ventricular shunt     Injury of shoulder or upper arm, left, subsequent encounter     S/P placement of VNS (vagus nerve stimulation) device

## 2024-01-03 NOTE — H&P PST ADULT - ASSESSMENT
DASI Score:  DASI Activity: able to go up one flight of stairs or walk 1-2 blocks with out difficulty  Loose or removable teeth: denies   DASI Score: 5.38  DASI Activity: Walks no devices. Able to feed self, dresses with supervision at group home,   Loose or removable teeth: denies

## 2024-01-03 NOTE — H&P PST ADULT - NSICDXPASTMEDICALHX_GEN_ALL_CORE_FT
PAST MEDICAL HISTORY:  Agitation at times and angry    Autistic disorder     Autistic disorder     Constipation     GERD (gastroesophageal reflux disease)     Psychomotor epilepsy, intractable     Seizure     Seizure disorder grand mal, started age 11     PAST MEDICAL HISTORY:  Agitation at times and angry    Autistic disorder     Constipation     Dislocation of left shoulder joint     GERD (gastroesophageal reflux disease)     Psychomotor epilepsy, intractable     Seizure     Seizure disorder grand mal, started age 11

## 2024-01-03 NOTE — H&P PST ADULT - PRIMARY CARE PROVIDER
Dr. Sheramn Lopez Dr. Sherman Lopez Dr. Sherman Lopez PCP/ Neuro  Dr. Sherman Lopez PCP/ Neuro  - ME to be scheduled Dr. Ania Mandujano ME on 1/8/24

## 2024-01-23 ENCOUNTER — TRANSCRIPTION ENCOUNTER (OUTPATIENT)
Age: 29
End: 2024-01-23

## 2024-01-24 ENCOUNTER — TRANSCRIPTION ENCOUNTER (OUTPATIENT)
Age: 29
End: 2024-01-24

## 2024-01-24 ENCOUNTER — OUTPATIENT (OUTPATIENT)
Dept: INPATIENT UNIT | Facility: HOSPITAL | Age: 29
LOS: 1 days | End: 2024-01-24
Payer: MEDICAID

## 2024-01-24 VITALS
RESPIRATION RATE: 16 BRPM | SYSTOLIC BLOOD PRESSURE: 127 MMHG | DIASTOLIC BLOOD PRESSURE: 79 MMHG | OXYGEN SATURATION: 98 % | TEMPERATURE: 99 F | HEART RATE: 70 BPM | WEIGHT: 144.62 LBS | HEIGHT: 62.99 IN

## 2024-01-24 VITALS
TEMPERATURE: 98 F | SYSTOLIC BLOOD PRESSURE: 135 MMHG | HEART RATE: 85 BPM | DIASTOLIC BLOOD PRESSURE: 65 MMHG | RESPIRATION RATE: 16 BRPM | OXYGEN SATURATION: 100 %

## 2024-01-24 DIAGNOSIS — Z96.89 PRESENCE OF OTHER SPECIFIED FUNCTIONAL IMPLANTS: Chronic | ICD-10-CM

## 2024-01-24 DIAGNOSIS — Z98.2 PRESENCE OF CEREBROSPINAL FLUID DRAINAGE DEVICE: Chronic | ICD-10-CM

## 2024-01-24 DIAGNOSIS — K02.62 DENTAL CARIES ON SMOOTH SURFACE PENETRATING INTO DENTIN: ICD-10-CM

## 2024-01-24 DIAGNOSIS — S49.92XD UNSPECIFIED INJURY OF LEFT SHOULDER AND UPPER ARM, SUBSEQUENT ENCOUNTER: Chronic | ICD-10-CM

## 2024-01-24 PROCEDURE — D4341: CPT

## 2024-01-24 PROCEDURE — C9399: CPT

## 2024-01-24 PROCEDURE — D1208: CPT

## 2024-01-24 RX ORDER — ERGOCALCIFEROL 1.25 MG/1
1 CAPSULE ORAL
Refills: 0 | DISCHARGE

## 2024-01-24 RX ORDER — ONDANSETRON 8 MG/1
4 TABLET, FILM COATED ORAL ONCE
Refills: 0 | Status: DISCONTINUED | OUTPATIENT
Start: 2024-01-24 | End: 2024-01-24

## 2024-01-24 RX ORDER — RUFINAMIDE 40 MG/ML
5 SUSPENSION ORAL
Refills: 0 | DISCHARGE

## 2024-01-24 RX ORDER — ACETAMINOPHEN 500 MG
1000 TABLET ORAL ONCE
Refills: 0 | Status: DISCONTINUED | OUTPATIENT
Start: 2024-01-24 | End: 2024-01-24

## 2024-01-24 RX ORDER — ESLICARBAZEPINE ACETATE 800 MG/1
1 TABLET ORAL
Refills: 0 | DISCHARGE

## 2024-01-24 RX ORDER — DOXAZOSIN MESYLATE 4 MG
1 TABLET ORAL
Refills: 0 | DISCHARGE

## 2024-01-24 RX ORDER — OXYCODONE HYDROCHLORIDE 5 MG/1
5 TABLET ORAL ONCE
Refills: 0 | Status: DISCONTINUED | OUTPATIENT
Start: 2024-01-24 | End: 2024-01-24

## 2024-01-24 RX ORDER — DIAZEPAM 5 MG
10 TABLET ORAL
Refills: 0 | DISCHARGE

## 2024-01-24 RX ORDER — QUETIAPINE FUMARATE 200 MG/1
1 TABLET, FILM COATED ORAL
Refills: 0 | DISCHARGE

## 2024-01-24 RX ORDER — OXYCODONE HYDROCHLORIDE 5 MG/1
10 TABLET ORAL ONCE
Refills: 0 | Status: DISCONTINUED | OUTPATIENT
Start: 2024-01-24 | End: 2024-01-24

## 2024-01-24 NOTE — PATIENT PROFILE ADULT - FALL HARM RISK - UNIVERSAL INTERVENTIONS
Bed in lowest position, wheels locked, appropriate side rails in place/Call bell, personal items and telephone in reach/Instruct patient to call for assistance before getting out of bed or chair/Non-slip footwear when patient is out of bed/[place] to call system/Physically safe environment - no spills, clutter or unnecessary equipment/Purposeful Proactive Rounding/Room/bathroom lighting operational, light cord in reach

## 2024-01-24 NOTE — ASU DISCHARGE PLAN (ADULT/PEDIATRIC) - ASU DC SPECIAL INSTRUCTIONSFT
comprehensive dental treatment under general anaesthesia   ******************************************************************************************   exam, and  periodontal treatment with Flouride perfomed  ******************************************************************************************   see Dr Xiong on 2/13 at 1:30 pm, appt is set  ******************************************************************************************   resume all medications   ******************************************************************************************   return to program tomorrow if patient feels well

## 2024-01-24 NOTE — ASU DISCHARGE PLAN (ADULT/PEDIATRIC) - NS MD DC FALL RISK RISK
For information on Fall & Injury Prevention, visit: [email or web address] OR  [email or web address] OR  https://www.cdc.gov/steadi/patient.html

## 2024-02-27 NOTE — PRE-OP CHECKLIST - NS PREOP CHK CHLOROHEX WASH
Refill passed per protocol.    Requested Prescriptions   Pending Prescriptions Disp Refills    finasteride 5 MG Oral Tab 90 tablet 3     Sig: Take 1 tablet (5 mg total) by mouth daily.       Genitourinary Medications Passed - 2/27/2024  2:19 PM        Passed - Patient does not have pulmonary hypertension on problem list        Passed - In person appointment or virtual visit in the past 12 mos or appointment in next 3 mos     Recent Outpatient Visits              1 week ago Routine general medical [facility] health care facility    [facility]    Office Visit    4 months ago [facility]    [facility]    8 months ago [facility]    Office Visit    11 months ago Type 2 diabetes mellitus without complication, without long-term current use of insulin ([facility])    [facility] [address], [place] Di Drake PA-C    Office Visit    1 year ago Non-recurrent bilateral inguinal hernia without obstruction or gangrene    [facility]    [facility]                        tamsulosin 0.4 MG Oral Cap 90 capsule 3     Sig: Take 1 capsule (0.4 mg total) by mouth daily with dinner.       Genitourinary Medications Passed - 2/27/2024  2:19 PM        Passed - Patient does not have pulmonary hypertension on problem list        Passed - In person appointment or virtual visit in the past 12 mos or appointment in next 3 mos     Recent Outpatient Visits              1 week ago Routine general medical examination at a health care facility    [facility] [place] [facility]    Office Visit    4 months ago BPH with obstruction/lower urinary tract symptoms     [facility]    [facility]    8 months ago [facility]inary [facility]    Office Visit    11 months ago Type 2 diabetes mellitus without complication, without long-term current use of insulin ([facility])    [facility] [place] Nguyen, Minhxuyen, PA-C    Office Visit    1 year ago Non-recurrent bilateral inguinal hernia without obstruction or gangrene    [facility], Nabor Jenkins MD    Office Visit                             Recent Outpatient Visits              1 week ago Routine general medical examination at a health care facility    [facility] [place], Minhxuyen, PA-C    Office Visit    4 months ago BPH with obstruction/lower urinary tract symptoms    [facility], Nader Randolph MD    Office Visit    8 months ago Urinary retention    [facility], Nader Randolph MD    Office Visit    11 months ago Type 2 diabetes mellitus without complication, without long-term current use of insulin ([facility])    [facility] [place], Minhxuyen, PA-C    Office Visit    1 year ago Non-recurrent bilateral inguinal hernia without obstruction or gangrene    [facility], Nabor Jenkins MD    Office Visit           N/A

## 2024-05-13 NOTE — PATIENT PROFILE ADULT. - TEACHING/LEARNING FACTORS IMPACT ABILITY TO LEARN
----- Message from DARRICK Hagen sent at 5/13/2024 10:05 AM CDT -----  Please notify the patient of normal results.  Follow-up as planned.  
Normal mammo.   
Pt informed of below. No further questions or concerns at this time.     
learning disabilities

## 2024-07-24 ENCOUNTER — NON-APPOINTMENT (OUTPATIENT)
Age: 29
End: 2024-07-24

## 2025-07-07 NOTE — ED PROVIDER NOTE - NS ED MD DISPO DISCHARGE CCDA
Please complete a fleet enema 2 hours prior to your banding.   Fleet enemas are over the counter and are available at your local store ([employer], [employer], [facility])  
Patient/Caregiver provided printed discharge information.

## (undated) DEVICE — DRAPE INSTRUMENT POUCH 6.75" X 11"

## (undated) DEVICE — GOWN TRIMAX LG

## (undated) DEVICE — WARMING BLANKET LOWER ADULT

## (undated) DEVICE — LAP PAD 18 X 18"

## (undated) DEVICE — ELCTR BOVIE PENCIL SMOKE EVACUATION

## (undated) DEVICE — SOL IRR POUR H2O 250ML

## (undated) DEVICE — MEDICATION LABELS W MARKER

## (undated) DEVICE — DRAPE TOWEL BLUE STICKY

## (undated) DEVICE — TONSIL ROLLS

## (undated) DEVICE — VAGINAL PACKING 2 X 6"

## (undated) DEVICE — DRAPE LIGHT HANDLE COVER (BLUE)

## (undated) DEVICE — VENODYNE/SCD SLEEVE CALF LARGE

## (undated) DEVICE — POSITIONER FOAM EGG CRATE ULNAR 2PCS (PINK)

## (undated) DEVICE — DRAPE MAYO STAND 30"

## (undated) DEVICE — GLV 7 PROTEXIS (WHITE)

## (undated) DEVICE — PACK BASIC GOWN

## (undated) DEVICE — SOL IRR POUR NS 0.9% 500ML

## (undated) DEVICE — SUCTION YANKAUER OPEN TIP NO VENT CURVE

## (undated) DEVICE — VISITEC 4X4

## (undated) DEVICE — DRAPE 3/4 SHEET W REINFORCEMENT 56X77"

## (undated) DEVICE — SPECIMEN CONTAINER 100ML

## (undated) DEVICE — GLV 6.5 PROTEXIS (WHITE)

## (undated) DEVICE — TUBING SUCTION 20FT

## (undated) DEVICE — GLV 7.5 PROTEXIS (WHITE)